# Patient Record
Sex: FEMALE | Race: WHITE
[De-identification: names, ages, dates, MRNs, and addresses within clinical notes are randomized per-mention and may not be internally consistent; named-entity substitution may affect disease eponyms.]

---

## 2021-06-15 PROBLEM — Z00.00 ENCOUNTER FOR PREVENTIVE HEALTH EXAMINATION: Status: ACTIVE | Noted: 2021-06-15

## 2021-06-21 ENCOUNTER — APPOINTMENT (OUTPATIENT)
Dept: OBGYN | Facility: CLINIC | Age: 66
End: 2021-06-21
Payer: MEDICARE

## 2021-06-21 ENCOUNTER — ASOB RESULT (OUTPATIENT)
Age: 66
End: 2021-06-21

## 2021-06-21 VITALS
SYSTOLIC BLOOD PRESSURE: 122 MMHG | HEIGHT: 64 IN | DIASTOLIC BLOOD PRESSURE: 72 MMHG | WEIGHT: 160 LBS | HEART RATE: 71 BPM | BODY MASS INDEX: 27.31 KG/M2 | TEMPERATURE: 97.9 F

## 2021-06-21 DIAGNOSIS — Z01.419 ENCOUNTER FOR GYNECOLOGICAL EXAMINATION (GENERAL) (ROUTINE) W/OUT ABNORMAL FINDINGS: ICD-10-CM

## 2021-06-21 DIAGNOSIS — R10.2 PELVIC AND PERINEAL PAIN: ICD-10-CM

## 2021-06-21 DIAGNOSIS — Z82.49 FAMILY HISTORY OF ISCHEMIC HEART DISEASE AND OTHER DISEASES OF THE CIRCULATORY SYSTEM: ICD-10-CM

## 2021-06-21 DIAGNOSIS — Z78.9 OTHER SPECIFIED HEALTH STATUS: ICD-10-CM

## 2021-06-21 DIAGNOSIS — D21.9 BENIGN NEOPLASM OF CONNECTIVE AND OTHER SOFT TISSUE, UNSPECIFIED: ICD-10-CM

## 2021-06-21 DIAGNOSIS — Z87.898 PERSONAL HISTORY OF OTHER SPECIFIED CONDITIONS: ICD-10-CM

## 2021-06-21 DIAGNOSIS — Z86.018 PERSONAL HISTORY OF OTHER BENIGN NEOPLASM: ICD-10-CM

## 2021-06-21 DIAGNOSIS — Z63.5 DISRUPTION OF FAMILY BY SEPARATION AND DIVORCE: ICD-10-CM

## 2021-06-21 LAB
BILIRUB UR QL STRIP: NORMAL
CLARITY UR: CLEAR
COLLECTION METHOD: NORMAL
GLUCOSE UR-MCNC: NORMAL
HCG UR QL: 0.2 EU/DL
HGB UR QL STRIP.AUTO: ABNORMAL
KETONES UR-MCNC: NORMAL
LEUKOCYTE ESTERASE UR QL STRIP: NORMAL
NITRITE UR QL STRIP: NORMAL
PH UR STRIP: 6
PROT UR STRIP-MCNC: NORMAL
SP GR UR STRIP: 1.01

## 2021-06-21 PROCEDURE — 99072 ADDL SUPL MATRL&STAF TM PHE: CPT

## 2021-06-21 PROCEDURE — 81003 URINALYSIS AUTO W/O SCOPE: CPT | Mod: QW

## 2021-06-21 PROCEDURE — ZZZZZ: CPT

## 2021-06-21 PROCEDURE — 99387 INIT PM E/M NEW PAT 65+ YRS: CPT

## 2021-06-21 RX ORDER — ZOLPIDEM TARTRATE 10 MG/1
10 TABLET, FILM COATED ORAL
Refills: 0 | Status: ACTIVE | COMMUNITY

## 2021-06-21 SDOH — SOCIAL STABILITY - SOCIAL INSECURITY: DISRUPTION OF FAMILY BY SEPARATION AND DIVORCE: Z63.5

## 2021-06-21 NOTE — PROCEDURE
[Cervical Pap Smear] : cervical Pap smear [Liquid Base] : liquid base [Tolerated Well] : the patient tolerated the procedure well [No Complications] : there were no complications [Pelvic Pain] : pelvic pain [Fibroid Uterus] : fibroid uterus [FreeTextEntry4] : fibroid uterus at least 3 fibroids, nl ENDO, NL OVARIES B/L

## 2021-06-21 NOTE — PHYSICAL EXAM
[Appropriately responsive] : appropriately responsive [Alert] : alert [No Acute Distress] : no acute distress [No Lymphadenopathy] : no lymphadenopathy [No Murmurs] : no murmurs [Soft] : soft [Non-distended] : non-distended [No HSM] : No HSM [No Lesions] : no lesions [No Mass] : no mass [Oriented x3] : oriented x3 [Examination Of The Breasts] : a normal appearance [] : implants [No Masses] : no breast masses were palpable [Labia Majora] : normal [Labia Minora] : normal [Normal] : normal [Enlarged ___ wks] : enlarged [unfilled] ~Uweeks [Uterine Adnexae] : normal  [Adnexa Tenderness On The Left] : tender [FreeTextEntry6] : wnl implants b/l [FreeTextEntry7] : tender  llq

## 2021-06-21 NOTE — DISCUSSION/SUMMARY
[FreeTextEntry1] : \par 67 yo p0020 llq pelvic pain, annual exam , r/o diverticulitis \par u dip neg \par  pap hpv\par mammogram\par requesting MRI of the breasts ( due to implants 27 yo saline ) \par pelvic sono- fibroid uterus

## 2021-06-21 NOTE — HISTORY OF PRESENT ILLNESS
[Patient reported mammogram was normal] : Patient reported mammogram was normal [Patient reported PAP Smear was normal] : Patient reported PAP Smear was normal [Patient reported bone density results were normal] : Patient reported bone density results were normal [Patient reported colonoscopy was normal] : Patient reported colonoscopy was normal [Insomnia] : insomnia [Menarche Age: ____] : age at menarche was [unfilled] [Menopause Age: ____] : age at menopause was [unfilled] [No] : Patient does not have concerns regarding sex [Previously active] : previously active [TextBox_4] : GYNhx\par h/o firboids\par no cyst no std\par  h/o infertility - laparoscopy , ivf [Mammogramdate] : 2019 [PapSmeardate] : 2018 [BoneDensityDate] : 2017 [ColonoscopyDate] : 2013 [PGHxTotal] : 2 [PGHxAbortions] : 2 [[facility]] : 0 [FreeTextEntry1] : 2 top

## 2021-06-28 LAB — HPV HIGH+LOW RISK DNA PNL CVX: NOT DETECTED

## 2021-06-30 LAB — CYTOLOGY CVX/VAG DOC THIN PREP: NORMAL

## 2021-07-09 ENCOUNTER — NON-APPOINTMENT (OUTPATIENT)
Age: 66
End: 2021-07-09

## 2021-07-13 ENCOUNTER — NON-APPOINTMENT (OUTPATIENT)
Age: 66
End: 2021-07-13

## 2021-11-08 DIAGNOSIS — Z98.82 BREAST IMPLANT STATUS: ICD-10-CM

## 2021-12-07 ENCOUNTER — APPOINTMENT (OUTPATIENT)
Dept: RHEUMATOLOGY | Facility: CLINIC | Age: 66
End: 2021-12-07
Payer: MEDICARE

## 2021-12-07 VITALS
HEART RATE: 78 BPM | DIASTOLIC BLOOD PRESSURE: 82 MMHG | TEMPERATURE: 97.8 F | SYSTOLIC BLOOD PRESSURE: 120 MMHG | BODY MASS INDEX: 27.31 KG/M2 | WEIGHT: 160 LBS | HEIGHT: 64 IN | OXYGEN SATURATION: 98 %

## 2021-12-07 DIAGNOSIS — R53.82 CHRONIC FATIGUE, UNSPECIFIED: ICD-10-CM

## 2021-12-07 PROCEDURE — 99205 OFFICE O/P NEW HI 60 MIN: CPT

## 2021-12-08 NOTE — ASSESSMENT
[FreeTextEntry1] : 66 year old female here for evaluation of SLE.\par \par 1. Fatigue, hair loss, bruising\par -Doubt SLE\par -Check labs\par -If negative does not need to follow up. If positive will have her return for follow up to discuss results

## 2021-12-08 NOTE — PHYSICAL EXAM
[General Appearance - Alert] : alert [General Appearance - In No Acute Distress] : in no acute distress [Sclera] : the sclera and conjunctiva were normal [PERRL With Normal Accommodation] : pupils were equal in size, round, and reactive to light [Extraocular Movements] : extraocular movements were intact [Outer Ear] : the ears and nose were normal in appearance [Oropharynx] : the oropharynx was normal [Neck Appearance] : the appearance of the neck was normal [Neck Cervical Mass (___cm)] : no neck mass was observed [Jugular Venous Distention Increased] : there was no jugular-venous distention [Thyroid Diffuse Enlargement] : the thyroid was not enlarged [Thyroid Nodule] : there were no palpable thyroid nodules [Auscultation Breath Sounds / Voice Sounds] : lungs were clear to auscultation bilaterally [Heart Rate And Rhythm] : heart rate was normal and rhythm regular [Heart Sounds] : normal S1 and S2 [Heart Sounds Gallop] : no gallops [Murmurs] : no murmurs [Heart Sounds Pericardial Friction Rub] : no pericardial rub [Bowel Sounds] : normal bowel sounds [Abdomen Soft] : soft [Abdomen Tenderness] : non-tender [Abdomen Mass (___ Cm)] : no abdominal mass palpated [Abnormal Walk] : normal gait [Nail Clubbing] : no clubbing  or cyanosis of the fingernails [Musculoskeletal - Swelling] : no joint swelling seen [Motor Tone] : muscle strength and tone were normal [Skin Color & Pigmentation] : normal skin color and pigmentation [Skin Turgor] : normal skin turgor [] : no rash [Affect] : the affect was normal [Mood] : the mood was normal

## 2021-12-08 NOTE — HISTORY OF PRESENT ILLNESS
[FreeTextEntry1] : 66-year-old female who presents for evaluation of lupus.\par \par She reports a 3-year history of fatigue and recently noticed bruising after going to the gym and working out.  She has hair loss as well but she has noticed lately. She denies fevers, weight loss, night sweats, rash, photosensitivity, raynaud's, oral ulcers, nasal ulcers,  sinus problems, nosebleeds, visual disturbances, dry eyes, dry mouth, history of VTE, history of miscarriage, respiratory complaints, GI or  complaints, chest pains. No FH of CTD

## 2021-12-10 DIAGNOSIS — R82.90 UNSPECIFIED ABNORMAL FINDINGS IN URINE: ICD-10-CM

## 2022-10-11 ENCOUNTER — APPOINTMENT (OUTPATIENT)
Dept: RHEUMATOLOGY | Facility: CLINIC | Age: 67
End: 2022-10-11

## 2023-05-01 ENCOUNTER — APPOINTMENT (OUTPATIENT)
Dept: PAIN MANAGEMENT | Facility: CLINIC | Age: 68
End: 2023-05-01
Payer: MEDICARE

## 2023-05-09 ENCOUNTER — APPOINTMENT (OUTPATIENT)
Dept: PAIN MANAGEMENT | Facility: CLINIC | Age: 68
End: 2023-05-09
Payer: MEDICARE

## 2023-05-09 ENCOUNTER — APPOINTMENT (OUTPATIENT)
Dept: ORTHOPEDIC SURGERY | Facility: CLINIC | Age: 68
End: 2023-05-09
Payer: MEDICARE

## 2023-05-09 VITALS — BODY MASS INDEX: 27.31 KG/M2 | WEIGHT: 160 LBS | HEIGHT: 64 IN

## 2023-05-09 VITALS — HEIGHT: 63 IN | WEIGHT: 168 LBS | BODY MASS INDEX: 29.77 KG/M2

## 2023-05-09 VITALS — SYSTOLIC BLOOD PRESSURE: 125 MMHG | DIASTOLIC BLOOD PRESSURE: 90 MMHG

## 2023-05-09 DIAGNOSIS — M16.12 UNILATERAL PRIMARY OSTEOARTHRITIS, LEFT HIP: ICD-10-CM

## 2023-05-09 DIAGNOSIS — M46.1 SACROILIITIS, NOT ELSEWHERE CLASSIFIED: ICD-10-CM

## 2023-05-09 PROCEDURE — 99204 OFFICE O/P NEW MOD 45 MIN: CPT

## 2023-05-09 PROCEDURE — 99203 OFFICE O/P NEW LOW 30 MIN: CPT

## 2023-05-09 RX ORDER — CYCLOBENZAPRINE HYDROCHLORIDE 5 MG/1
5 TABLET, FILM COATED ORAL
Qty: 30 | Refills: 0 | Status: ACTIVE | COMMUNITY
Start: 2023-05-09 | End: 1900-01-01

## 2023-05-09 NOTE — HISTORY OF PRESENT ILLNESS
[de-identified] : left hip pain\par OA \par considering FERMÍN\par we discussed surgery\par pain in the groin and thigh\par \par x-rays reviwed on CD demonstrate moderate to severe OA of left hip\par \par We discussed the surgery, including a description of the surgery in layman's terms, preoperative evaluation, the hospital stay, anesthesia, and expected outcome.  \par Models equivalent to the actual hip replacement prosthesis were used to demonstrate the magnitude and scope of the surgery.  Various types of implant fixation including cement and biologic fixation were described.  The patient shared in the decision making and agreed to the use of implants.\par \par Additionally surgical risks were discussed.  The patient has good understanding of the inherent risks of surgery which include bleeding, pain, and infection, blood clots, and any medical issues that may arise in the perioperative period.  Additionally, we discussed risks uniquely pertinent to hip replacement surgery, including leg length discrepancy, instability, loosening of components, numbness around the incision, nerve palsy, and possible need for revision surgery should the replacement not function optimally.  All questions were answered and the patient verbalized understanding.  I encouraged the patient to contact me should any further questions are issues arise.\par

## 2023-05-12 NOTE — PHYSICAL EXAM
[de-identified] : Lumbar Spine Exam:\par Inspection:\par erythema (-)\par ecchymosis (-)\par rashes (-)\par alignment: no scoliosis\par \par Palpation:\par Midline lumbar tenderness:             (-)\par paraspinal tenderness:                  L (+) ; R (-)\par sciatic nerve tenderness :             L (-) ; R (-)\par SI joint tenderness:                        L (+) ; R (-)\par GTB tenderness:                            L (-);  R (-)\par \par Limited ROM 2/2 to pain with left hip flexion & ex rotation\par \par Strength:\par 5/5 throughout all muscle groups of the lower extremity\par \par                                    Right       Left   \par Hip Flexion:                (5/5)       (5/5)\par Quadriceps:               (5/5)       (5/5)\par Hamstrings:                (5/5)       (5/5)\par Ankle Dorsiflexion:     (5/5)       (5/5)\par EHL:                           (5/5)       (5/5)\par Ankle Plantarflexion:  (5/5)       (5/5)\par \par Special Tests:\par SLR:                           R (-) ; L (-)\par Facet loading:            R (-) ; L (-)\par KATHLEEN test:               R (-) ; L (+)\par \par Neurologic:\par Light touch intact throughout LE \par Reflexes normal and symmetric \par \par Gait:\par non- antalgic gait\par ambulates w/o assistive device

## 2023-05-12 NOTE — HISTORY OF PRESENT ILLNESS
[FreeTextEntry1] : Rowena presents with complaints of low back / left buttock and hip pain for the past 6 months. The pain started suddenly and without injury of inciting event. The pain has been worsening over that time frame and is severe. Pain is 7/10 on average and sometimes worse with particular activitiy. The pain is there all the time and made worse at night trying to sleep. The patient complaints about being unable to lay on either side. The pain is sharp, stabbing and shooting in the left buttock and can radiate down the posterior / lateral thigh and occasionally into the hip / groin. Pain is made worse with laying down, sitting, standing from prolonged seated position. The patient can only walk 2 blocks. Pain limits her ability to participate in recreational activities, exercises. Pain is not relieved with physical therapy and chiropractic manipulation or use of motrin and naproxen. 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

## 2023-05-12 NOTE — DISCUSSION/SUMMARY
[de-identified] : Recommendation\par 1. The patient is having significant left hip pain with minimal relief with conservative treatments so we decided to proceed with an intra-articular left hip injection. The risks and benefits were discussed which included bruising, hematoma, worse pain, intravascular injection, steroid reaction. All questions were answered and concerns addressed. The patient understands that this is likely a temporary solution to their pain. The patient may have up to three intra-articular joint injections a year and needs to consider surgical options for longer term relief of pain should they not improve. They will schedule at the earliest convenience. \par Left hip injection\par \par 2. meloxicam 15mg qD PRN\par I advised SUSY that the NSAID should be taken with food.  In addition while taking the prescribed NSAID, no over the counter or other NSAIDs should be used, such as ibuprofen (Motrin or Advil) or naproxen (Aleve) as this can cause stomach upset or other side effects.  If needed for fever or breakthrough pain Tylenol can be used. \par \par 3. Cyclobenzaprine PRN\par We are prescribing a muscle relaxant medication to help the patient's muscle spasm pain. The patient understands to not take this medication before driving or operating any heavy machinery as it can be sedating. \par \par f/u after inj

## 2023-06-08 ENCOUNTER — APPOINTMENT (OUTPATIENT)
Dept: ORTHOPEDIC SURGERY | Facility: CLINIC | Age: 68
End: 2023-06-08

## 2023-07-25 ENCOUNTER — APPOINTMENT (OUTPATIENT)
Dept: ORTHOPEDIC SURGERY | Facility: CLINIC | Age: 68
End: 2023-07-25
Payer: MEDICARE

## 2023-07-25 PROCEDURE — 99213 OFFICE O/P EST LOW 20 MIN: CPT

## 2023-07-26 NOTE — HISTORY OF PRESENT ILLNESS
[de-identified] : f/u of left hip\par still considering surgery\par has pain about hip with prom\par no contracture\par no shortening\par pain with resisted flexion\par \par she wants to proceed with the replacement\par however she lives alone in [place] and has stairs in her house,shes not sure how she would manage the rehab\par she is looking into that and will get back to us if she decides to schedule

## 2023-08-08 ENCOUNTER — TRANSCRIPTION ENCOUNTER (OUTPATIENT)
Age: 68
End: 2023-08-08

## 2023-10-03 ENCOUNTER — APPOINTMENT (OUTPATIENT)
Dept: ORTHOPEDIC SURGERY | Facility: CLINIC | Age: 68
End: 2023-10-03

## 2023-11-01 ENCOUNTER — APPOINTMENT (OUTPATIENT)
Dept: ORTHOPEDIC SURGERY | Facility: HOSPITAL | Age: 68
End: 2023-11-01

## 2023-11-21 ENCOUNTER — APPOINTMENT (OUTPATIENT)
Dept: ORTHOPEDIC SURGERY | Facility: CLINIC | Age: 68
End: 2023-11-21

## 2024-06-18 ENCOUNTER — APPOINTMENT (OUTPATIENT)
Dept: PAIN MANAGEMENT | Facility: CLINIC | Age: 69
End: 2024-06-18

## 2024-06-18 DIAGNOSIS — K57.90 DIVERTICULOSIS OF INTESTINE, PART UNSPECIFIED, W/OUT PERFORATION OR ABSCESS W/OUT BLEEDING: ICD-10-CM

## 2024-06-18 DIAGNOSIS — M79.604 LOW BACK PAIN, UNSPECIFIED: ICD-10-CM

## 2024-06-18 DIAGNOSIS — M54.50 LOW BACK PAIN, UNSPECIFIED: ICD-10-CM

## 2024-06-18 DIAGNOSIS — M54.16 RADICULOPATHY, LUMBAR REGION: ICD-10-CM

## 2024-06-18 DIAGNOSIS — M79.605 LOW BACK PAIN, UNSPECIFIED: ICD-10-CM

## 2024-06-18 PROCEDURE — 99214 OFFICE O/P EST MOD 30 MIN: CPT

## 2024-06-18 RX ORDER — MELOXICAM 15 MG/1
15 TABLET ORAL
Qty: 30 | Refills: 0 | Status: ACTIVE | COMMUNITY
Start: 2023-05-09 | End: 1900-01-01

## 2024-06-19 PROBLEM — M54.50 LOW BACK PAIN RADIATING TO BOTH LEGS: Status: ACTIVE | Noted: 2024-06-19

## 2024-06-19 NOTE — PHYSICAL EXAM
[de-identified] : Lumbar Spine Exam: Inspection: erythema (-) ecchymosis (-) rashes (-) alignment: no scoliosis  Palpation: Midline lumbar tenderness:             (-) paraspinal tenderness:                  L (+) ; R (-) sciatic nerve tenderness :             L (-) ; R (-) SI joint tenderness:                        L (+) ; R (+) GTB tenderness:                            L (-);  R (-)  Limited ROM 2/2 to pain with left hip flexion & ex rotation  Strength: 5/5 throughout all muscle groups of the lower extremity                                     Right       Left    Hip Flexion:                (5/5)       (5/5) Quadriceps:               (5/5)       (5/5) Hamstrings:                (5/5)       (5/5) Ankle Dorsiflexion:     (5/5)       (5/5) EHL:                           (5/5)       (5/5) Ankle Plantarflexion:  (5/5)       (5/5)  Special Tests: SLR:                           R (-) ; L (-) Facet loading:            R (+) ; L (+) KATHLEEN test:               R (-) ; L (+)  Neurologic: Light touch intact throughout LE  Reflexes normal and symmetric   Gait: non- antalgic gait ambulates w/o assistive device

## 2024-06-19 NOTE — HISTORY OF PRESENT ILLNESS
[FreeTextEntry1] : Rowena presents with complaints of low back / left buttock and hip pain for the past 6 months. The pain started suddenly and without injury of inciting event. The pain has been worsening over that time frame and is severe. Pain is 7/10 on average and sometimes worse with particular activitiy. The pain is there all the time and made worse at night trying to sleep. The patient complaints about being unable to lay on either side. The pain is sharp, stabbing and shooting in the left buttock and can radiate down the posterior / lateral thigh and occasionally into the hip / groin. Pain is made worse with laying down, sitting, standing from prolonged seated position. The patient can only walk 2 blocks. Pain limits her ability to participate in recreational activities, exercises. Pain is not relieved with physical therapy and chiropractic manipulation or use of motrin and naproxen.   MRI of the left hip shows thinning of cartilage, moderate left hip effusion, degenerative tear of the left acetabular labrum.  MRI L spine shows multilevel degenerative disc disease without canal or neuroforaminal stenosis  TODAY, 6-: Revisit encounter.   Since her last office visit, she underwent a total left hip replacement. Post surgery, she states she developed post-surgical pain along with a lot of bruising in her legs. Since, she also has noticed lower back pain along with radicular component. She has been experiencing a shooting sensation in the legs along with a constant cramping in the calves. She feels like her muscles are throbbing in her calves. Pain is present daily.

## 2024-06-19 NOTE — DISCUSSION/SUMMARY
[de-identified] : A discussion regarding available pain management treatment options occurred with the patient. These included interventional, rehabilitative, pharmacological, and alternative modalities. We will proceed with the following:     Interventional treatment options: - Discussed a sacroiliac joint injection. Hold off for now.   Imaging: - Patient is having worsening pain after surgical correction to the right hip. Based on the history, physical exam, I do not agree that this is hip related. I suspect this is referring pain from her back or sacroiliac joint/facet joint. I would like to obtain a new MRI of the lumbar spine to rule out any new disc pathology like any anatomic changes of the lumbar discs, nerves, and surrounding tissue that will help provide information to accurately diagnose the patient's cause of pain and therefore treat said pain generator in the most effective way possible - whether that be specific physical therapy recommendations, medications, and/or interventional therapies.   Medications: 1. Ordered Meloxicam 15 mg to be taken for 2 weeks straight and then stop.   I advised Ms. Hastings that the NSAID should be taken with food.  In addition while taking the prescribed NSAID, no over the counter or other NSAIDs should be used, such as ibuprofen (Motrin or Advil) or naproxen (Aleve) as this can cause stomach upset or other side effects.  If needed for fever or breakthrough pain Tylenol can be used.   - Follow up in 2 weeks to discuss imaging.   I Angela Fulton attest that this documentation has been prepared under the direction and in the presence of provider Dr. Isreal Fontaine.  The documentation recorded by the scribe in my presence, accurately reflects the service I personally performed, and the decisions made by me with my edits as appropriate.   Isreal Fontaine, DO

## 2024-07-09 ENCOUNTER — APPOINTMENT (OUTPATIENT)
Dept: PAIN MANAGEMENT | Facility: CLINIC | Age: 69
End: 2024-07-09

## 2024-07-09 DIAGNOSIS — M54.16 RADICULOPATHY, LUMBAR REGION: ICD-10-CM

## 2024-07-09 PROCEDURE — 99214 OFFICE O/P EST MOD 30 MIN: CPT

## 2024-07-17 ENCOUNTER — RX RENEWAL (OUTPATIENT)
Age: 69
End: 2024-07-17